# Patient Record
Sex: FEMALE | URBAN - METROPOLITAN AREA
[De-identification: names, ages, dates, MRNs, and addresses within clinical notes are randomized per-mention and may not be internally consistent; named-entity substitution may affect disease eponyms.]

---

## 2017-05-26 ENCOUNTER — DOCUMENTATION ONLY (OUTPATIENT)
Dept: CARDIOLOGY | Facility: CLINIC | Age: 52
End: 2017-05-26

## 2017-05-26 NOTE — PROGRESS NOTES
Patient referred to Dr. Villarreal by Dr. Arroyo for TAVR consideration.  Records reviewed.  Patient is 52 y/o female with long history of multiple brain tumors.  She is wheel chair bound, deaf and blind.  She is currently asymptomatic from AS.   spoke with  on phone.  Not a TAVR candidate.  If she becomes symptomatic in the future, will offer palliative BAV.  Records scanned to [facility].   will notify family.